# Patient Record
Sex: MALE | Race: WHITE | NOT HISPANIC OR LATINO | Employment: OTHER | URBAN - NONMETROPOLITAN AREA
[De-identification: names, ages, dates, MRNs, and addresses within clinical notes are randomized per-mention and may not be internally consistent; named-entity substitution may affect disease eponyms.]

---

## 2018-05-01 ENCOUNTER — OFFICE VISIT (OUTPATIENT)
Dept: CARDIOLOGY | Facility: CLINIC | Age: 83
End: 2018-05-01

## 2018-05-01 VITALS
DIASTOLIC BLOOD PRESSURE: 54 MMHG | HEART RATE: 60 BPM | WEIGHT: 211.6 LBS | SYSTOLIC BLOOD PRESSURE: 131 MMHG | BODY MASS INDEX: 32.07 KG/M2 | OXYGEN SATURATION: 98 % | HEIGHT: 68 IN

## 2018-05-01 DIAGNOSIS — R94.31 ABNORMAL ELECTROCARDIOGRAM: ICD-10-CM

## 2018-05-01 DIAGNOSIS — Z00.00 HEALTHCARE MAINTENANCE: ICD-10-CM

## 2018-05-01 DIAGNOSIS — R60.9 PERIPHERAL EDEMA: Primary | ICD-10-CM

## 2018-05-01 PROCEDURE — 93000 ELECTROCARDIOGRAM COMPLETE: CPT | Performed by: NURSE PRACTITIONER

## 2018-05-01 PROCEDURE — 99203 OFFICE O/P NEW LOW 30 MIN: CPT | Performed by: NURSE PRACTITIONER

## 2018-05-01 RX ORDER — FUROSEMIDE 20 MG/1
20 TABLET ORAL DAILY
Refills: 0 | COMMUNITY
Start: 2018-04-21

## 2018-05-01 RX ORDER — IBUPROFEN 600 MG/1
600 TABLET ORAL 3 TIMES DAILY PRN
Refills: 0 | COMMUNITY
Start: 2018-04-21

## 2018-05-01 NOTE — PROGRESS NOTES
Subjective   Christiano Palencia is a 84 y.o. male     Chief Complaint   Patient presents with   • Edema     presents as a new patient        HPI    Problem List:    1. Peripheral edema  2. Pseudo Glaucoma right eye  3. Splenectomy     Patient is an 84-year-old male who presents today to establish care.  He denies any chest pain, pressure, palpitations, fluttering, dizziness, presyncope, syncope, orthopnea or PND.  Patient said that he felt back in December and since falling he's been having swelling in his legs.  He also says that he's got some blisters on them as well.  He says that they improve a little bit but the swelling came back.  He does not have diabetes as he was just tested.  Patient says that he does not get short of breath with activity however his activity is very limited.    Occupation: Writer and worked for [employer]  Denies smoking, alcohol and illicit drugs  Rarely drinks a soda, 2 cups of coffee per day, occasional glass of tea    Mom 63 -heart failure, emphysema  Dad 73 -heart disease, smoker  Paternal grandmother 99 -old age  Paternal grandfather 89 -old age  Paternal aunt alive 104    Current Outpatient Prescriptions   Medication Sig Dispense Refill   • furosemide (LASIX) 20 MG tablet Take 20 mg by mouth Daily.  0   • ibuprofen (ADVIL,MOTRIN) 600 MG tablet Take 600 mg by mouth 3 (Three) Times a Day As Needed.  0   • timolol (BETIMOL) 0.25 % ophthalmic solution Administer 1 drop to both eyes Daily.       No current facility-administered medications for this visit.        ALLERGIES    Review of patient's allergies indicates no known allergies.    Past Medical History:   Diagnosis Date   • Edema        Social History     Social History   • Marital status:      Spouse name: N/A   • Number of children: N/A   • Years of education: N/A     Occupational History   • Not on file.     Social History Main Topics   • Smoking status: Never Smoker   • Smokeless tobacco:  "Never Used   • Alcohol use No   • Drug use: No   • Sexual activity: Not on file     Other Topics Concern   • Not on file     Social History Narrative   • No narrative on file       Family History   Problem Relation Age of Onset   • No Known Problems Mother    • No Known Problems Father        Review of Systems   Constitutional: Negative for diaphoresis and fatigue.   HENT: Negative for rhinorrhea, sinus pain, sinus pressure and sneezing.    Eyes: Positive for visual disturbance ( wears glasses ).   Respiratory: Negative for chest tightness and shortness of breath.    Cardiovascular: Positive for leg swelling (has been on and off since fall in Dec. ). Negative for chest pain and palpitations.   Gastrointestinal: Negative for constipation, diarrhea, nausea and vomiting.   Endocrine: Negative.    Genitourinary: Negative for difficulty urinating.   Musculoskeletal: Positive for arthralgias (right pointer finger ), back pain (sometimes lower back, not bad) and gait problem (using a rolling walker ). Negative for myalgias and neck pain.   Skin: Positive for wound (blisters BLE ).   Allergic/Immunologic: Negative for environmental allergies.   Neurological: Negative for dizziness, syncope, light-headedness and headaches.   Hematological: Does not bruise/bleed easily.   Psychiatric/Behavioral: The patient is not nervous/anxious.        Objective   /54 (BP Location: Left arm, Patient Position: Sitting)   Pulse 60   Ht 171.5 cm (67.5\")   Wt 96 kg (211 lb 9.6 oz)   SpO2 98%   BMI 32.65 kg/m²   Vitals:    05/01/18 1450   BP: 131/54   BP Location: Left arm   Patient Position: Sitting   Pulse: 60   SpO2: 98%   Weight: 96 kg (211 lb 9.6 oz)   Height: 171.5 cm (67.5\")      Lab Results (most recent)     None        Physical Exam   Constitutional: He is oriented to person, place, and time. Vital signs are normal. He appears well-developed and well-nourished. He is active and cooperative.   HENT:   Head: Normocephalic. "   Eyes: Lids are normal.   Wears glasses    Neck: Normal carotid pulses, no hepatojugular reflux and no JVD present. Carotid bruit is not present.   Cardiovascular: Normal rate, regular rhythm and normal heart sounds.    Pulses:       Radial pulses are 2+ on the right side, and 2+ on the left side.   1-2+ edema BLE; unable to palpate pulses due to dressings   Pulmonary/Chest: Effort normal and breath sounds normal.   Abdominal: Normal appearance and bowel sounds are normal.   Musculoskeletal:   Uses a rolling walker    Neurological: He is alert and oriented to person, place, and time.   Skin: Skin is warm and dry. There is erythema (BLE).   Dressing on BLE    Psychiatric: He has a normal mood and affect. His speech is normal and behavior is normal. Judgment and thought content normal. Cognition and memory are normal.       Procedure     ECG 12 Lead  Date/Time: 5/1/2018 4:25 PM  Performed by: GENARO JENSEN  Authorized by: GENARO JENSEN   Comparison: not compared with previous ECG   Rhythm: sinus bradycardia  Ectopy: infrequent PVCs  Rate: bradycardic  BPM: 56  QRS axis: normal  Clinical impression: non-specific ECG                 Assessment/Plan      Diagnosis Plan   1. Peripheral edema  Adult Transthoracic Echo Complete W/ Cont if Necessary Per Protocol   2. Abnormal electrocardiogram   Adult Transthoracic Echo Complete W/ Cont if Necessary Per Protocol       Return in about 11 weeks (around 7/17/2018).       peripheral edema/abnormal EKG-patient have echo.  He will continue his Lasix for now.  We will call Dr. Knight office and get copies of his labs.  He will follow-up in 11 weeks or sooner if any changes.  Patient was advised to elevate legs and to watch sodium intake.        Patient's Body mass index is 32.65 kg/m². BMI is above normal parameters. Follow-up plan includes:  educational material.      Electronically signed by:

## 2018-05-01 NOTE — PATIENT INSTRUCTIONS
Obesity, Adult  Obesity is the condition of having too much total body fat. Being overweight or obese means that your weight is greater than what is considered healthy for your body size. Obesity is determined by a measurement called BMI. BMI is an estimate of body fat and is calculated from height and weight. For adults, a BMI of 30 or higher is considered obese.  Obesity can eventually lead to other health concerns and major illnesses, including:  · Stroke.  · Coronary artery disease (CAD).  · Type 2 diabetes.  · Some types of cancer, including cancers of the colon, breast, uterus, and gallbladder.  · Osteoarthritis.  · High blood pressure (hypertension).  · High cholesterol.  · Sleep apnea.  · Gallbladder stones.  · Infertility problems.  What are the causes?  The main cause of obesity is taking in (consuming) more calories than your body uses for energy. Other factors that contribute to this condition may include:  · Being born with genes that make you more likely to become obese.  · Having a medical condition that causes obesity. These conditions include:  ¨ Hypothyroidism.  ¨ Polycystic ovarian syndrome (PCOS).  ¨ Binge-eating disorder.  ¨ Cushing syndrome.  · Taking certain medicines, such as steroids, antidepressants, and seizure medicines.  · Not being physically active (sedentary lifestyle).  · Living where there are limited places to exercise safely or buy healthy foods.  · Not getting enough sleep.  What increases the risk?  The following factors may increase your risk of this condition:  · Having a family history of obesity.  · Being a woman of -American descent.  · Being a man of  descent.  What are the signs or symptoms?  Having excessive body fat is the main symptom of this condition.  How is this diagnosed?  This condition may be diagnosed based on:  · Your symptoms.  · Your medical history.  · A physical exam. Your health care provider may measure:  ¨ Your BMI. If you are an adult  with a BMI between 25 and less than 30, you are considered overweight. If you are an adult with a BMI of 30 or higher, you are considered obese.  ¨ The distances around your hips and your waist (circumferences). These may be compared to each other to help diagnose your condition.  ¨ Your skinfold thickness. Your health care provider may gently pinch a fold of your skin and measure it.  How is this treated?  Treatment for this condition often includes changing your lifestyle. Treatment may include some or all of the following:  · Dietary changes. Work with your health care provider and a dietitian to set a weight-loss goal that is healthy and reasonable for you. Dietary changes may include eating:  ¨ Smaller portions. A portion size is the amount of a particular food that is healthy for you to eat at one time. This varies from person to person.  ¨ Low-calorie or low-fat options.  ¨ More whole grains, fruits, and vegetables.  · Regular physical activity. This may include aerobic activity (cardio) and strength training.  · Medicine to help you lose weight. Your health care provider may prescribe medicine if you are unable to lose 1 pound a week after 6 weeks of eating more healthily and doing more physical activity.  · Surgery. Surgical options may include gastric banding and gastric bypass. Surgery may be done if:  ¨ Other treatments have not helped to improve your condition.  ¨ You have a BMI of 40 or higher.  ¨ You have life-threatening health problems related to obesity.  Follow these instructions at home:     Eating and drinking     · Follow recommendations from your health care provider about what you eat and drink. Your health care provider may advise you to:  ¨ Limit fast foods, sweets, and processed snack foods.  ¨ Choose low-fat options, such as low-fat milk instead of whole milk.  ¨ Eat 5 or more servings of fruits or vegetables every day.  ¨ Eat at home more often. This gives you more control over what you  eat.  ¨ Choose healthy foods when you eat out.  ¨ Learn what a healthy portion size is.  ¨ Keep low-fat snacks on hand.  ¨ Avoid sugary drinks, such as soda, fruit juice, iced tea sweetened with sugar, and flavored milk.  ¨ Eat a healthy breakfast.  · Drink enough water to keep your urine clear or pale yellow.  · Do not go without eating for long periods of time (do not fast) or follow a fad diet. Fasting and fad diets can be unhealthy and even dangerous.  Physical Activity   · Exercise regularly, as told by your health care provider. Ask your health care provider what types of exercise are safe for you and how often you should exercise.  · Warm up and stretch before being active.  · Cool down and stretch after being active.  · Rest between periods of activity.  Lifestyle   · Limit the time that you spend in front of your TV, computer, or video game system.  · Find ways to reward yourself that do not involve food.  · Limit alcohol intake to no more than 1 drink a day for nonpregnant women and 2 drinks a day for men. One drink equals 12 oz of beer, 5 oz of wine, or 1½ oz of hard liquor.  General instructions   · Keep a weight loss journal to keep track of the food you eat and how much you exercise you get.  · Take over-the-counter and prescription medicines only as told by your health care provider.  · Take vitamins and supplements only as told by your health care provider.  · Consider joining a support group. Your health care provider may be able to recommend a support group.  · Keep all follow-up visits as told by your health care provider. This is important.  Contact a health care provider if:  · You are unable to meet your weight loss goal after 6 weeks of dietary and lifestyle changes.  This information is not intended to replace advice given to you by your health care provider. Make sure you discuss any questions you have with your health care provider.  Document Released: 01/25/2006 Document Revised:  05/22/2017 Document Reviewed: 10/05/2016  [employer] Interactive Patient Education © 2017 [employer]  MyPlate from [employer]  The general, healthful diet is based on the 2010 Dietary Guidelines for Americans. The amount of food you need to eat from each food group depends on your age, sex, and level of physical activity and can be individualized by a dietitian. Go to ChooseMyPlate.gov for more information.  What do I need to know about the MyPlate plan?  · Enjoy your food, but eat less.  · Avoid oversized portions.  ¨ ½ of your plate should include fruits and vegetables.  ¨ ¼ of your plate should be grains.  ¨ ¼ of your plate should be protein.  Grains   · Make at least half of your grains whole grains.  · For a 2,000 calorie daily food plan, eat 6 oz every day.  · 1 oz is about 1 slice bread, 1 cup cereal, or ½ cup cooked rice, cereal, or pasta.  Vegetables   · Make half your plate fruits and vegetables.  · For a 2,000 calorie daily food plan, eat 2½ cups every day.  · 1 cup is about 1 cup raw or cooked vegetables or vegetable juice or 2 cups raw leafy greens.  Fruits   · Make half your plate fruits and vegetables.  · For a 2,000 calorie daily food plan, eat 2 cups every day.  · 1 cup is about 1 cup fruit or 100% fruit juice or ½ cup dried fruit.  Protein   · For a 2,000 calorie daily food plan, eat 5½ oz every day.  · 1 oz is about 1 oz meat, poultry, or fish, ¼ cup cooked beans, 1 egg, 1 Tbsp peanut butter, or ½ oz nuts or seeds.  Dairy   · Switch to fat-free or low-fat (1%) milk.  · For a 2,000 calorie daily food plan, eat 3 cups every day.  · 1 cup is about 1 cup milk or yogurt or soy milk (soy beverage), 1½ oz natural cheese, or 2 oz processed cheese.  Fats, Oils, and Empty Calories   · Only small amounts of oils are recommended.  · Empty calories are calories from solid fats or added sugars.  · Compare sodium in foods like soup, bread, and frozen meals. Choose the foods with lower numbers.  · Drink water instead  of sugary drinks.  What foods can I eat?  Grains   Whole grains such as whole wheat, quinoa, millet, and bulgur. Bread, rolls, and pasta made from whole grains. Brown or wild rice. Hot or cold cereals made from whole grains and without added sugar.  Vegetables   All fresh vegetables, especially fresh red, dark green, or orange vegetables. Peas and beans. Low-sodium frozen or canned vegetables prepared without added salt. Low-sodium vegetable juices.  Fruits   All fresh, frozen, and dried fruits. Canned fruit packed in water or fruit juice without added sugar. Fruit juices without added sugar.  Meats and Other Protein Sources   Boiled, baked, or grilled lean meat trimmed of fat. Skinless poultry. Fresh seafood and shellfish. Canned seafood packed in water. Unsalted nuts and unsalted nut butters. Tofu. Dried beans and pea. Eggs.  Dairy   Low-fat or fat-free milk, yogurt, and cheeses.  Sweets and Desserts   Frozen desserts made from low-fat milk.  Fats and Oils   Olive, peanut, and canola oils and margarine. Salad dressing and mayonnaise made from these oils.  Other   Soups and casseroles made from allowed ingredients and without added fat or salt.  The items listed above may not be a complete list of recommended foods or beverages. Contact your dietitian for more options.   What foods are not recommended?  Grains   Sweetened, low-fiber cereals. Packaged baked goods. Snack crackers and chips. Cheese crackers, butter crackers, and biscuits. Frozen waffles, sweet breads, doughnuts, pastries, packaged baking mixes, pancakes, cakes, and cookies.  Vegetables   Regular canned or frozen vegetables or vegetables prepared with salt. Canned tomatoes. Canned tomato sauce. Fried vegetables. Vegetables in cream sauce or cheese sauce.  Fruits   Fruits packed in syrup or made with added sugar.  Meats and Other Protein Sources   Marbled or fatty meats such as ribs. Poultry with skin. Fried meats, poultry, eggs, or fish. Sausages, hot  dogs, and deli meats such as pastrami, bologna, or salami.  Dairy   Whole milk, cream, cheeses made from whole milk, sour cream. Ice cream or yogurt made from whole milk or with added sugar.  Beverages   For adults, no more than one alcoholic drink per day. Regular soft drinks or other sugary beverages. Juice drinks.  Sweets and Desserts   Sugary or fatty desserts, candy, and other sweets.  Fats and Oils   Solid shortening or partially hydrogenated oils. Solid margarine. Margarine that contains trans fats. Butter.  The items listed above may not be a complete list of foods and beverages to avoid. Contact your dietitian for more information.   This information is not intended to replace advice given to you by your health care provider. Make sure you discuss any questions you have with your health care provider.  Document Released: 01/06/2009 Document Revised: 05/25/2017 Document Reviewed: 11/26/2014  [employer] Interactive Patient Education © 2017 [employer]    Edema  Edema is an abnormal buildup of fluids in your body tissues. Edema is somewhat dependent on gravity to pull the fluid to the lowest place in your body. That makes the condition more common in the legs and thighs (lower extremities). Painless swelling of the feet and ankles is common and becomes more likely as you get older. It is also common in looser tissues, like around your eyes.  When the affected area is squeezed, the fluid may move out of that spot and leave a dent for a few moments. This dent is called pitting.  What are the causes?  There are many possible causes of edema. Eating too much salt and being on your feet or sitting for a long time can cause edema in your legs and ankles. Hot weather may make edema worse. Common medical causes of edema include:  · Heart failure.  · Liver disease.  · Kidney disease.  · Weak blood vessels in your legs.  · Cancer.  · An injury.  · Pregnancy.  · Some medications.  · Obesity.  What are the signs or  symptoms?  Edema is usually painless. Your skin may look swollen or shiny.  How is this diagnosed?  Your health care provider may be able to diagnose edema by asking about your medical history and doing a physical exam. You may need to have tests such as X-rays, an electrocardiogram, or blood tests to check for medical conditions that may cause edema.  How is this treated?  Edema treatment depends on the cause. If you have heart, liver, or kidney disease, you need the treatment appropriate for these conditions. General treatment may include:  · Elevation of the affected body part above the level of your heart.  · Compression of the affected body part. Pressure from elastic bandages or support stockings squeezes the tissues and forces fluid back into the blood vessels. This keeps fluid from entering the tissues.  · Restriction of fluid and salt intake.  · Use of a water pill (diuretic). These medications are appropriate only for some types of edema. They pull fluid out of your body and make you urinate more often. This gets rid of fluid and reduces swelling, but diuretics can have side effects. Only use diuretics as directed by your health care provider.  Follow these instructions at home:  · Keep the affected body part above the level of your heart when you are lying down.  · Do not sit still or stand for prolonged periods.  · Do not put anything directly under your knees when lying down.  · Do not wear constricting clothing or garters on your upper legs.  · Exercise your legs to work the fluid back into your blood vessels. This may help the swelling go down.  · Wear elastic bandages or support stockings to reduce ankle swelling as directed by your health care provider.  · Eat a low-salt diet to reduce fluid if your health care provider recommends it.  · Only take medicines as directed by your health care provider.  Contact a health care provider if:  · Your edema is not responding to treatment.  · You have heart,  liver, or kidney disease and notice symptoms of edema.  · You have edema in your legs that does not improve after elevating them.  · You have sudden and unexplained weight gain.  Get help right away if:  · You develop shortness of breath or chest pain.  · You cannot breathe when you lie down.  · You develop pain, redness, or warmth in the swollen areas.  · You have heart, liver, or kidney disease and suddenly get edema.  · You have a fever and your symptoms suddenly get worse.  This information is not intended to replace advice given to you by your health care provider. Make sure you discuss any questions you have with your health care provider.  Document Released: 12/18/2006 Document Revised: 05/25/2017 Document Reviewed: 10/10/2014  [employer] Interactive Patient Education © 2017 [employer]

## 2018-05-18 ENCOUNTER — HOSPITAL ENCOUNTER (OUTPATIENT)
Dept: CARDIOLOGY | Facility: HOSPITAL | Age: 83
Discharge: HOME | End: 2018-05-18
Admitting: NURSE PRACTITIONER

## 2018-05-18 ENCOUNTER — OUTSIDE FACILITY SERVICE (OUTPATIENT)
Dept: CARDIOLOGY | Facility: CLINIC | Age: 83
End: 2018-05-18

## 2018-05-18 DIAGNOSIS — R94.31 ABNORMAL ELECTROCARDIOGRAM: ICD-10-CM

## 2018-05-18 DIAGNOSIS — R60.9 PERIPHERAL EDEMA: ICD-10-CM

## 2018-05-18 LAB
MAXIMAL PREDICTED HEART RATE: 136 BPM
STRESS TARGET HR: 116 BPM

## 2018-05-18 PROCEDURE — 93306 TTE W/DOPPLER COMPLETE: CPT

## 2018-05-18 PROCEDURE — 93306 TTE W/DOPPLER COMPLETE: CPT | Performed by: INTERNAL MEDICINE

## 2018-05-29 ENCOUNTER — TELEPHONE (OUTPATIENT)
Dept: CARDIOLOGY | Facility: CLINIC | Age: 83
End: 2018-05-29

## 2018-06-04 ENCOUNTER — TELEPHONE (OUTPATIENT)
Dept: CARDIOLOGY | Facility: CLINIC | Age: 83
End: 2018-06-04